# Patient Record
Sex: FEMALE | Race: WHITE
[De-identification: names, ages, dates, MRNs, and addresses within clinical notes are randomized per-mention and may not be internally consistent; named-entity substitution may affect disease eponyms.]

---

## 2020-12-05 ENCOUNTER — HOSPITAL ENCOUNTER (EMERGENCY)
Dept: HOSPITAL 54 - ER | Age: 60
Discharge: HOME | End: 2020-12-05
Payer: COMMERCIAL

## 2020-12-05 VITALS — SYSTOLIC BLOOD PRESSURE: 141 MMHG | DIASTOLIC BLOOD PRESSURE: 98 MMHG

## 2020-12-05 VITALS — WEIGHT: 145 LBS | BODY MASS INDEX: 21.48 KG/M2 | HEIGHT: 69 IN
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
## 2020-12-05 DIAGNOSIS — R07.89: ICD-10-CM

## 2020-12-05 DIAGNOSIS — S16.1XXA: Primary | ICD-10-CM
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
## 2020-12-05 DIAGNOSIS — R51.9: ICD-10-CM
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
## 2020-12-05 DIAGNOSIS — Z88.6: ICD-10-CM
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
